# Patient Record
Sex: MALE | Race: BLACK OR AFRICAN AMERICAN | Employment: UNEMPLOYED | URBAN - METROPOLITAN AREA
[De-identification: names, ages, dates, MRNs, and addresses within clinical notes are randomized per-mention and may not be internally consistent; named-entity substitution may affect disease eponyms.]

---

## 2022-06-24 ENCOUNTER — HOSPITAL ENCOUNTER (OUTPATIENT)
Age: 82
Setting detail: OBSERVATION
Discharge: HOME | End: 2022-06-26
Attending: EMERGENCY MEDICINE | Admitting: INTERNAL MEDICINE
Payer: MEDICARE

## 2022-06-24 ENCOUNTER — APPOINTMENT (OUTPATIENT)
Dept: CT IMAGING | Age: 82
End: 2022-06-24
Attending: EMERGENCY MEDICINE
Payer: MEDICARE

## 2022-06-24 DIAGNOSIS — K76.82 HEPATIC ENCEPHALOPATHY: Primary | ICD-10-CM

## 2022-06-24 PROCEDURE — G0378 HOSPITAL OBSERVATION PER HR: HCPCS

## 2022-06-24 PROCEDURE — 96374 THER/PROPH/DIAG INJ IV PUSH: CPT

## 2022-06-24 PROCEDURE — 74011000258 HC RX REV CODE- 258: Performed by: INTERNAL MEDICINE

## 2022-06-24 PROCEDURE — 74011250636 HC RX REV CODE- 250/636: Performed by: INTERNAL MEDICINE

## 2022-06-24 PROCEDURE — 99285 EMERGENCY DEPT VISIT HI MDM: CPT

## 2022-06-24 PROCEDURE — 70450 CT HEAD/BRAIN W/O DYE: CPT

## 2022-06-24 PROCEDURE — 96375 TX/PRO/DX INJ NEW DRUG ADDON: CPT

## 2022-06-24 PROCEDURE — 74011250636 HC RX REV CODE- 250/636: Performed by: EMERGENCY MEDICINE

## 2022-06-24 RX ORDER — DIPHENHYDRAMINE HYDROCHLORIDE 50 MG/ML
25 INJECTION, SOLUTION INTRAMUSCULAR; INTRAVENOUS
Status: DISCONTINUED | OUTPATIENT
Start: 2022-06-24 | End: 2022-06-26 | Stop reason: HOSPADM

## 2022-06-24 RX ORDER — SODIUM CHLORIDE 9 MG/ML
75 INJECTION, SOLUTION INTRAVENOUS CONTINUOUS
Status: DISCONTINUED | OUTPATIENT
Start: 2022-06-24 | End: 2022-06-26 | Stop reason: HOSPADM

## 2022-06-24 RX ORDER — LORAZEPAM 2 MG/ML
2 INJECTION INTRAMUSCULAR
Status: COMPLETED | OUTPATIENT
Start: 2022-06-24 | End: 2022-06-24

## 2022-06-24 RX ADMIN — SODIUM CHLORIDE 75 ML/HR: 9 INJECTION, SOLUTION INTRAVENOUS at 18:40

## 2022-06-24 RX ADMIN — LORAZEPAM 2 MG: 2 INJECTION INTRAMUSCULAR; INTRAVENOUS at 17:54

## 2022-06-24 RX ADMIN — DIPHENHYDRAMINE HYDROCHLORIDE 25 MG: 50 INJECTION, SOLUTION INTRAMUSCULAR; INTRAVENOUS at 23:49

## 2022-06-24 RX ADMIN — MEROPENEM 1 G: 1 INJECTION INTRAVENOUS at 23:15

## 2022-06-24 NOTE — ED PROVIDER NOTES
EMERGENCY DEPARTMENT HISTORY AND PHYSICAL EXAM        Date: 6/24/2022  Patient Name: Ashlyn Navas    History of Presenting Illness     Chief Complaint   Patient presents with    Altered mental status     History Provided By: EMS and ED physician, documentation review    HPI: Ashlyn Navas, 80 y.o. male with history of biliary cirrhosis, creatinine adenocarcinoma, atrial fibrillation, CKD, and DVT who presents with altered mental status. Symptoms started over the last 24 hours. He has not been taking his lactulose regularly as he is supposed to. His wife noted that he was becoming confused and altered. He started becoming agitated. He went to [facility] in [place], [place] to the emergency department there. Diagnosed with likely hepatic encephalopathy and will be transported here for further care. They did attempt to give him lactulose rectally. Unclear if he received a dose. Patient is altered and a poor historian. He denies having any pain. He otherwise is repetitive does not answer questions appropriately. This limits the HPI. PCP: None        Past History     Past Medical History:  DVT, atrial fibrillation, CKD, pancreatic adenocarcinoma, liver cirrhosis    Past Surgical History:  Whipple procedure    Family History:  No family history on file. Social History:  Social History     Tobacco Use    Smoking status: Not on file    Smokeless tobacco: Not on file   Substance Use Topics    Alcohol use: Not on file    Drug use: Not on file       Allergies:  No Known Allergies        Review of Systems   Review of Systems   Unable to perform ROS: Mental status change     Physical Exam   Constitutional: No acute distress. Confused. Well-nourished. Skin: No rash. ENT: No rhinorrhea. No cough. Head is normocephalic and atraumatic. Eye: No proptosis or conjunctival injections. Respiratory: No apparent respiratory distress. Lungs are clear. Gastrointestinal: Nondistended.   Soft and nontender. Musculoskeletal: No obvious bony deformities. Neuro: Confused. Alert. Oriented to self but not to place or year. Follows instructions and directions. Moves all 4 extremities normally. No other focal deficits. Diagnostic Study Results     Labs -   No results found for this or any previous visit (from the past 24 hour(s)). Radiologic Studies -   CT HEAD WO CONT    (Results Pending)     CT Results  (Last 48 hours)    None        CXR Results  (Last 48 hours)    None          Medical Decision Making and ED Course     I reviewed the available vital signs, nursing notes, past medical history, past surgical history, family history, and social history. Vital Signs - Reviewed the patient's vital signs. Patient Vitals for the past 12 hrs:   Temp Pulse Resp BP SpO2   06/24/22 1715 97.9 °F (36.6 °C) 74 18 (!) 144/76 99 %         Records Reviewed: Reviewed outside documentations from transferring facility occluding labs, HPI. Medical Decision Making:   Presented with altered mental status. The differential diagnosis is hepatic encephalopathy, TIA, noncompliance. Patient appears to be altered. He was given 2 mg IV Ativan in the ED for continued confusion and somewhat agitation. I have ordered lactulose which I recommended was given rectally. I reviewed documentation and he does have a ammonia level of 107 and bilirubin of 3.3. Lactic acid level was 3.3 however trending down. No concern for sepsis. CT head completed here since I did not find documentation of this which shows no acute findings. Discussed with Dr. Jane Leroy for admission. Disposition     Admitted to Dr. Jane Leroy    Diagnosis     Clinical impression:   1. Acute encephalopathy         Attestation:  Please note that this dictation was completed with [employer], the [employer] voice recognition software.  Quite often unanticipated grammatical, syntax, homophones, and other interpretive errors are inadvertently transcribed by the computer software. Please disregard these errors. Please excuse any errors that have escaped final proofreading. Thank you.   Tory Prader, DO

## 2022-06-24 NOTE — ED NOTES
TRANSFER - OUT REPORT:    Verbal report given to 5W(name) on Tri Boyd  being transferred to [facility](unit) for routine progression of care       Report consisted of patients Situation, Background, Assessment and   Recommendations(SBAR). Information from the following report(s) SBAR, Kardex, ED Summary, Intake/Output and MAR was reviewed with the receiving nurse. Lines:   Peripheral IV 06/24/22 Left Antecubital (Active)   Site Assessment Clean, dry, & intact 06/24/22 1720   Phlebitis Assessment 0 06/24/22 1720   Infiltration Assessment 0 06/24/22 1720   Dressing Status Clean, dry, & intact 06/24/22 1720        Opportunity for questions and clarification was provided.       Patient transported with:   [employer]

## 2022-06-24 NOTE — ED TRIAGE NOTES
Pt arrives via EMS from [facility]. Pt is a transfer. Pt was seen and treated for agitation, and acute altered mental status. Possible acute encephalopathy. Takes lactulose and hasn't had in the last few days. When pt is awake he is combative and agitated. Pt responds well to ativan.

## 2022-06-25 LAB
ALBUMIN SERPL-MCNC: 2.8 G/DL (ref 3.5–5)
ALBUMIN/GLOB SERPL: 0.8 {RATIO} (ref 1.1–2.2)
ALP SERPL-CCNC: 233 U/L (ref 45–117)
ALT SERPL-CCNC: 34 U/L (ref 12–78)
AMMONIA PLAS-SCNC: 37 UMOL/L
ANION GAP SERPL CALC-SCNC: 9 MMOL/L (ref 5–15)
AST SERPL W P-5'-P-CCNC: 39 U/L (ref 15–37)
BASOPHILS # BLD: 0 K/UL (ref 0–0.1)
BASOPHILS NFR BLD: 1 % (ref 0–1)
BILIRUB SERPL-MCNC: 3.5 MG/DL (ref 0.2–1)
BUN SERPL-MCNC: 47 MG/DL (ref 6–20)
BUN/CREAT SERPL: 14 (ref 12–20)
CA-I BLD-MCNC: 8.3 MG/DL (ref 8.5–10.1)
CHLORIDE SERPL-SCNC: 115 MMOL/L (ref 97–108)
CO2 SERPL-SCNC: 21 MMOL/L (ref 21–32)
CREAT SERPL-MCNC: 3.46 MG/DL (ref 0.7–1.3)
DIFFERENTIAL METHOD BLD: ABNORMAL
EOSINOPHIL # BLD: 0.1 K/UL (ref 0–0.4)
EOSINOPHIL NFR BLD: 2 % (ref 0–7)
ERYTHROCYTE [DISTWIDTH] IN BLOOD BY AUTOMATED COUNT: 16.3 % (ref 11.5–14.5)
GLOBULIN SER CALC-MCNC: 3.4 G/DL (ref 2–4)
GLUCOSE SERPL-MCNC: 86 MG/DL (ref 65–100)
HCT VFR BLD AUTO: 29.3 % (ref 36.6–50.3)
HGB BLD-MCNC: 9.3 G/DL (ref 12.1–17)
IMM GRANULOCYTES # BLD AUTO: 0 K/UL (ref 0–0.04)
IMM GRANULOCYTES NFR BLD AUTO: 0 % (ref 0–0.5)
INR PPP: 2.8 (ref 0.9–1.1)
LYMPHOCYTES # BLD: 0.8 K/UL (ref 0.8–3.5)
LYMPHOCYTES NFR BLD: 13 % (ref 12–49)
MCH RBC QN AUTO: 29.3 PG (ref 26–34)
MCHC RBC AUTO-ENTMCNC: 31.7 G/DL (ref 30–36.5)
MCV RBC AUTO: 92.4 FL (ref 80–99)
MONOCYTES # BLD: 0.4 K/UL (ref 0–1)
MONOCYTES NFR BLD: 6 % (ref 5–13)
NEUTS SEG # BLD: 4.8 K/UL (ref 1.8–8)
NEUTS SEG NFR BLD: 78 % (ref 32–75)
NRBC # BLD: 0 K/UL (ref 0–0.01)
NRBC BLD-RTO: 0 PER 100 WBC
PLATELET # BLD AUTO: 76 K/UL (ref 150–400)
PMV BLD AUTO: 11.3 FL (ref 8.9–12.9)
POTASSIUM SERPL-SCNC: 3.9 MMOL/L (ref 3.5–5.1)
PROT SERPL-MCNC: 6.2 G/DL (ref 6.4–8.2)
PROTHROMBIN TIME: 28.6 SEC (ref 11.9–14.6)
RBC # BLD AUTO: 3.17 M/UL (ref 4.1–5.7)
SODIUM SERPL-SCNC: 145 MMOL/L (ref 136–145)
WBC # BLD AUTO: 6.1 K/UL (ref 4.1–11.1)

## 2022-06-25 PROCEDURE — G0378 HOSPITAL OBSERVATION PER HR: HCPCS

## 2022-06-25 PROCEDURE — 74011250636 HC RX REV CODE- 250/636: Performed by: EMERGENCY MEDICINE

## 2022-06-25 PROCEDURE — 74011250637 HC RX REV CODE- 250/637: Performed by: EMERGENCY MEDICINE

## 2022-06-25 PROCEDURE — 85610 PROTHROMBIN TIME: CPT

## 2022-06-25 PROCEDURE — 82140 ASSAY OF AMMONIA: CPT

## 2022-06-25 PROCEDURE — 36415 COLL VENOUS BLD VENIPUNCTURE: CPT

## 2022-06-25 PROCEDURE — 85025 COMPLETE CBC W/AUTO DIFF WBC: CPT

## 2022-06-25 PROCEDURE — 80053 COMPREHEN METABOLIC PANEL: CPT

## 2022-06-25 PROCEDURE — 74011250637 HC RX REV CODE- 250/637: Performed by: INTERNAL MEDICINE

## 2022-06-25 PROCEDURE — 74011000272 HC RX REV CODE- 272: Performed by: EMERGENCY MEDICINE

## 2022-06-25 RX ADMIN — LACTULOSE 1000 ML: 10 SOLUTION ORAL at 00:14

## 2022-06-25 RX ADMIN — SODIUM CHLORIDE 75 ML/HR: 9 INJECTION, SOLUTION INTRAVENOUS at 12:39

## 2022-06-25 RX ADMIN — LACTULOSE 30 ML: 20 SOLUTION ORAL at 23:18

## 2022-06-25 RX ADMIN — LACTULOSE 30 ML: 20 SOLUTION ORAL at 16:37

## 2022-06-25 RX ADMIN — LACTULOSE 30 ML: 20 SOLUTION ORAL at 08:44

## 2022-06-25 NOTE — PROGRESS NOTES
Medicare Outpatient Observation Notice (MOON)/ [place] Outpatient Observation Notice (Jaron Stevens) provided to patient/representative with verbal explanation of the notice. Time allotted for questions regarding the notice. Patient /representative provided a completed copy of the MOON/[facility] notice. Copy placed on bedside chart.

## 2022-06-25 NOTE — ROUTINE PROCESS
Lactulose enema not given in ER. The patient has been trying to get out of bed and is very strong. I have him iv Benadryl 25 at 2349 and [facility] [place] just gave him the lactulose enema. He could only tolerate half so it was leaking out the liquid but no stool yet . We will monitor for stools and urine.

## 2022-06-25 NOTE — PROGRESS NOTES
Received care of this patient. Patient is resting with eyes closed. 1:1 sitter at this time. Patient responds voice and touch. No events reported overnight.

## 2022-06-25 NOTE — PROGRESS NOTES
Problem: Pressure Injury - Risk of  Goal: *Prevention of pressure injury  Description: Document Dominick Scale and appropriate interventions in the flowsheet.   Outcome: Progressing Towards Goal  Note: Pressure Injury Interventions:  Sensory Interventions: Assess changes in LOC,Float heels,Keep linens dry and wrinkle-free,Minimize linen layers    Moisture Interventions: Absorbent underpads,Limit adult briefs,Minimize layers    Activity Interventions: PT/OT evaluation    Mobility Interventions: HOB 30 degrees or less,PT/OT evaluation    Nutrition Interventions: Document food/fluid/supplement intake    Friction and Shear Interventions: Apply protective barrier, creams and emollients,Minimize layers,Transferring/repositioning devices,Foam dressings/transparent film/skin sealants

## 2022-06-25 NOTE — H&P
History and Physical (Inpatient)    Patient:    Terryann Castleman   80 y.o. Chief Complaint:   Chief Complaint   Patient presents with    Altered mental status         History of Present Illness: This is an 66-year-old  male with history of pancreatic cancer survival 10 years ago follows at [facility] has a biliary drain that gets changed every 3 months last changed about a week ago also just had paracentesis in [address] in [place] [facility]  according to the wife. Apparently he missed a dose of lactulose and whenever he gets constipated he started having altered mental status. He was altered and then brought in here. He has been getting agitated and combative. He was transferred for further care. Patient is unable to give any meaningful information's. Information is obtained from wife and the chart and ED doctor. He is currently on one-to-one in [facility]. Wife states he is a DNR. ROS: Significant for agitation altered mental status. Patient is unable to give any meaningful information. History:  Pancreatic cancer x10 years. Biliary external  drainage for about 6 years not which gets changed every 3 months  Recent paracentesis  History of biliary stricture  Status post Whipple procedure  History of hepatic encephalopathy on chronic lactulose  History of CKD  History of DVT  History of atrial fibrillation  History of cirrhosis of the liver      Social history  He is . Does not have any children. He does not smoke or drink alcohol. No family history on file.     Allergies:  No Known Allergies    Current Medications:    Current Facility-Administered Medications:     0.9% sodium chloride infusion, 75 mL/hr, IntraVENous, CONTINUOUS, lAber Jackson DO, Last Rate: 75 mL/hr at 06/24/22 1840, 75 mL/hr at 06/24/22 1840    lactulose (CHRONULAC) 10 gram/15 mL 300 mL in sterile water irrigation 700 mL rectal enema, 1,000 mL, Rectal, ONCE, Alber Jackson DO   lactulose (CHRONULAC) 10 gram/15 mL solution 30 mL, 30 mL, Oral, TID, Danita Torrez MD       Physical Exam:  Visit Vitals  BP (!) 144/76   Pulse 74   Temp 97.9 °F (36.6 °C)   Resp 18   Ht 6' 1\" (1.854 m)   Wt 72.6 kg (160 lb)   SpO2 99%   BMI 21.11 kg/m²     On examination he is an elderly  male confused agitated in no respiratory distress. HEENT normocephalic atraumatic. Arcus senilis. Icteric sclera  Oral mucosa poor oral hygiene as best as I can see. Neck no distended neck vein. Lungs clear bilaterally no wheeze or crackles  Heart regular  Abdomen soft positive bowel sounds. Right upper quadrant drain. Lower extremities no cyanosis or edema  CNS awake lethargic confused but follows simple one-step command. He knows his wife's name is if he lifts his legs      Findings/Diagnosis: #1. Acute hepatic encephalopathy  #2. Cirrhotic liver disease with frequent paracentesis   #3. History of possible biliary stricture with chronic drain  #4. History of pancreatic cancer status post Whipple procedure  #5. History of atrial fibrillation  #6. History of CKD        Laboratory:      No results found for this or any previous visit (from the past 24 hour(s)). CT HEAD WO CONT   Final Result   Atrophy. No acute intracranial abnormality. Plan of Care/Planned Procedure: Admit to the hospital.  Observe. We will reinstate his lactulose was started by rectal enema. One-to-one observation. IV fluids. Fall precautions. CBC CMP. Discussed with his wife who says patient is a DO NOT RESUSCITATE.

## 2022-06-25 NOTE — ROUTINE PROCESS
Two person skin assessment done by Thomas Harris. And myself. Patient has moisture associated damage around rectal sacral area on each side . He has a biliary drain on his right side and a tiny incision on his left side with a small dressing on it.

## 2022-06-25 NOTE — PROGRESS NOTES
Reason for Admission:  Hepatic Encephalopathy                     RUR Score:  N/A                   Plan for utilizing home health:   Agreeable if recommended       PCP: First and Last name:  Dr. Ada Meza     Name of Practice: [facility]   Are you a current patient: Yes/No:    Approximate date of last visit: Unsure of last visit   Can you participate in a virtual visit with your PCP:                     Current Advanced Directive/Advance Care Plan: Full Code  CM confirmed with patient's wife that she would like for patient to be a Full Code. CM attempted to reach primary physician aware so that order can be changed. Healthcare Decision Maker:   Click here to complete [id number] including selection of the Healthcare Decision Maker Relationship (ie \"Primary\")           Wife, Jil Esquivel, [phone]                  Transition of Care Plan:                    CM spoke with patient's wife. Patient lives at home with his wife. There are no steps for patient to enter the back of their house, but patient does have steps to climb to get to their second story bedroom. Patient typically uses no DME, but has been very weak recently. Patient's wife is agreeable to any rehab services recommended. CM attempted to reach physician to obtain PT/OT orders, but had to leave message. CM will follow up with patient and wife once patient has been evaluated by PT/OT. Current Dispo: TBD based on therapy recs.

## 2022-06-26 VITALS
RESPIRATION RATE: 20 BRPM | OXYGEN SATURATION: 100 % | SYSTOLIC BLOOD PRESSURE: 143 MMHG | BODY MASS INDEX: 21.2 KG/M2 | TEMPERATURE: 97.9 F | HEIGHT: 73 IN | WEIGHT: 160 LBS | DIASTOLIC BLOOD PRESSURE: 66 MMHG | HEART RATE: 57 BPM

## 2022-06-26 LAB
ALBUMIN SERPL-MCNC: 2.6 G/DL (ref 3.5–5)
AMMONIA PLAS-SCNC: 64 UMOL/L
ANION GAP SERPL CALC-SCNC: 7 MMOL/L (ref 5–15)
BUN SERPL-MCNC: 46 MG/DL (ref 6–20)
BUN/CREAT SERPL: 14 (ref 12–20)
CA-I BLD-MCNC: 7.9 MG/DL (ref 8.5–10.1)
CHLORIDE SERPL-SCNC: 117 MMOL/L (ref 97–108)
CO2 SERPL-SCNC: 21 MMOL/L (ref 21–32)
CREAT SERPL-MCNC: 3.35 MG/DL (ref 0.7–1.3)
GLUCOSE SERPL-MCNC: 114 MG/DL (ref 65–100)
PHOSPHATE SERPL-MCNC: 4.3 MG/DL (ref 2.6–4.7)
POTASSIUM SERPL-SCNC: 4 MMOL/L (ref 3.5–5.1)
SODIUM SERPL-SCNC: 145 MMOL/L (ref 136–145)

## 2022-06-26 PROCEDURE — 74011250637 HC RX REV CODE- 250/637: Performed by: INTERNAL MEDICINE

## 2022-06-26 PROCEDURE — 82140 ASSAY OF AMMONIA: CPT

## 2022-06-26 PROCEDURE — 80069 RENAL FUNCTION PANEL: CPT

## 2022-06-26 PROCEDURE — 36415 COLL VENOUS BLD VENIPUNCTURE: CPT

## 2022-06-26 PROCEDURE — G0378 HOSPITAL OBSERVATION PER HR: HCPCS

## 2022-06-26 PROCEDURE — 74011250636 HC RX REV CODE- 250/636: Performed by: EMERGENCY MEDICINE

## 2022-06-26 RX ADMIN — SODIUM CHLORIDE 75 ML/HR: 9 INJECTION, SOLUTION INTRAVENOUS at 00:13

## 2022-06-26 RX ADMIN — LACTULOSE 30 ML: 20 SOLUTION ORAL at 08:26

## 2022-06-26 NOTE — DISCHARGE SUMMARY
Discharge Summary     John Alonso     Admit Date:   6/24/2022  5:12 PM  Discharge Date:   6/26/2022  Discharge Condition:    Improved, stable  Discharge Diagnosis  Problem List Items Addressed This Visit        Nervous    Hepatic encephalopathy ([address] - Primary    Relevant Medications    lactulose (CHRONULAC) 10 gram/15 mL solution      #1. Acute hepatic encephalopathy   #2. Cirrhotic liver disease with frequent paracentesis   #3. History of possible biliary stricture with chronic drain   #4. History of pancreatic cancer status post Whipple procedure   #5. History of atrial fibrillation   #6. History of CKD   #7. Mild protein calorie malnutrition     Hospital Stay  Narrative of Hospital Course: See H&P for full details. Briefly this is an 80-year-old  male with history of liver failure presents with altered mental status agitated as managed his lactulose dose. Patient was admitted to the hospital.  Started on IV fluids. He was given retention enema lactulose and continued on p.o. lactulose. His confusion and agitation has cleared. I have personally ambulated patient at least 76 feet steady will be discharged to home. Consultants:  None     Surgeries/procedures Performed:    IV fluids  Lactulose  CT head     Discharge Plan:    Home or Self Care     Hospital/Incidental Findings Requiring Follow Up:     Patient Instructions:  Please take your lactulose and other medications as prescribed by your previous doctor     Diet: DIET ADULT     Activity: As tolerated  For number of days (if applicable): Other Instructions:      Provider Follow-Up: Follow-up in 1 week with your regular doctor for routine care. Follow-up Appointments   Procedures    FOLLOW UP VISIT Appointment in: One Week     Standing Status:   Standing     Number of Occurrences:   1     Order Specific Question:   Appointment in     Answer:    One Week        Significant Diagnostic Studies:     CT HEAD WO CONT   Final Result Atrophy. No acute intracranial abnormality. Recent Results (from the past 24 hour(s))   AMMONIA    Collection Time: 06/26/22  8:20 AM   Result Value Ref Range    Ammonia, plasma 64 (H) <32 umol/L   RENAL FUNCTION PANEL    Collection Time: 06/26/22  8:20 AM   Result Value Ref Range    Sodium 145 136 - 145 mmol/L    Potassium 4.0 3.5 - 5.1 mmol/L    Chloride 117 (H) 97 - 108 mmol/L    CO2 21 21 - 32 mmol/L    Anion gap 7 5 - 15 mmol/L    Glucose 114 (H) 65 - 100 mg/dL    BUN 46 (H) 6 - 20 mg/dL    Creatinine 3.35 (H) 0.70 - 1.30 mg/dL    BUN/Creatinine ratio 14 12 - 20      GFR est AA 21 (L) >60 ml/min/1.73m2    GFR est non-AA 18 (L) >60 ml/min/1.73m2    Calcium 7.9 (L) 8.5 - 10.1 mg/dL    Phosphorus 4.3 2.6 - 4.7 mg/dL    Albumin 2.6 (L) 3.5 - 5.0 g/dL       Discharge Medications:  Current Discharge Medication List      START taking these medications    Details   lactulose (CHRONULAC) 10 gram/15 mL solution Take 30 mL by mouth three (3) times daily for 30 days.   Qty: 2700 mL, Refills: 0  Start date: 6/26/2022, End date: 7/26/2022              Time Spent on Discharge: More than 30 minutes

## 2022-06-26 NOTE — DISCHARGE INSTRUCTIONS
Patient Education        Learning About Hepatic Encephalopathy  What is hepatic encephalopathy? Hepatic encephalopathy (say \"hip-PAT-ik in-[facility]--[id number]\") is a problem in the brain. It happens when the liver has been damaged and can't filter toxins from the blood. These toxins build up in your bloodstream and affect your brain. This can lead to personality changes. It can also make it hard to think clearly and remember things. It may be caused by long-term (chronic) liver disease. These diseases include cirrhosis, liver failure, and a type of high blood pressure in the veins that filter blood into the liver (portal hypertension). In some cases, symptoms are mild. In other cases, they can be very serious. It depends on the cause and how much the condition has progressed. Your doctor may do a few tests to diagnose this problem. These may include blood tests and memory tests. You may have MRI or CT scans, which show a picture of your brain. You also may have an EEG. This is a test that shows the electrical activity of your brain. What can you expect when you have it? Some cases of hepatic encephalopathy last just a short time. They may be cured with treatment. If the condition lasts a long time (is chronic), it usually gets worse over time. But if you work closely with your doctor and follow your treatment plan, you may make some symptoms less severe. Flare-ups of symptoms can happen. Following your treatment plan may help reduce flare-ups. If your symptoms are severe, you may need help with daily activities. You may need support at home. Severe cases may require a stay in the hospital.  What are the symptoms? Symptoms may include feeling cranky, grouchy, or depressed. You may have problems finding words, thinking, concentrating, or remembering things. Your sleep pattern may change, such as being sleepy during the day and awake at night.  Symptoms such as twitching of muscles or jerking movements of hands may also occur. How is it treated? Treatment may include medicine to treat any other problems, such as bleeding in the digestive tract. Your doctor will also likely prescribe a medicine called lactulose. It increases bowel movements. This helps prevent the buildup of toxins in the blood that may lead to encephalopathy. Make sure to keep taking your medicine as directed, because it can keep the condition from quickly getting worse. Your doctor also may prescribe antibiotics, such as rifaximin. Your doctor also may talk to you about changes in diet. Certain foods may make symptoms worse. If treatment doesn't help, then a liver transplant may be needed. How can you prevent flare-ups? · Your doctor may talk to you about changing your diet. Certain foods may make symptoms worse. · Be safe with medicines. Take your medicines exactly as prescribed. Call your doctor if you think you are having a problem with your medicine. · Check with your doctor before you use any new medicines. Some medicines can cause symptoms to flare up. Follow-up care is a key part of your treatment and safety. Be sure to make and go to all appointments, and call your doctor if you are having problems. It's also a good idea to know your test results and keep a list of the medicines you take. Where can you learn more? Go to http://www.gray.com/  Enter T723 in the search box to learn more about \"Learning About Hepatic Encephalopathy. \"  Current as of: September 8, 2021               Content Version: 13.2  © 5430-6686 [employer]. Care instructions adapted under license by [employer] (which disclaims liability or warranty for this information). If you have questions about a medical condition or this instruction, always ask your healthcare professional. [address] any warranty or liability for your use of this information.